# Patient Record
(demographics unavailable — no encounter records)

---

## 2024-11-15 NOTE — PLAN
[FreeTextEntry1] : 1. The patient was given rx to obtain fasting bw - will follow results  2. The patient was instructed to keep routine f/u screenings - will be seeing GYN, needs eyes checked  3. Low fat low cholesterol diet exercise  4. Flu vaccine now  5. F/U 1 year or sooner if needed

## 2024-11-15 NOTE — HISTORY OF PRESENT ILLNESS
[FreeTextEntry1] : CPE [de-identified] : Patient is a 63-year-old female with PMH of ankylosing spondylitis, endometriosis, and fibromyalgia. Former pt of Dr. Silva  Last CPE: 2023   GYN Exam: Has appt in Jan   Mammogram: Due in Jan    DEXA: Unsure    Colonoscopy: 9/2024, repeat 5 years    Ophthalmology: Due now   Dermatology: Mohs sx 11/11/24 R cheek   Dentist:  UTD   Shingles:x2   Flu: 10/23   Tdap: 2020   PNA: x1   COVID: x4  Diet: Low carb   Exercise: Cardio 2x a week, walks daily

## 2024-11-15 NOTE — HEALTH RISK ASSESSMENT
[Yes] : Yes [Monthly or less (1 pt)] : Monthly or less (1 point) [1 or 2 (0 pts)] : 1 or 2 (0 points) [Never (0 pts)] : Never (0 points) [No] : In the past 12 months have you used drugs other than those required for medical reasons? No [0] : 2) Feeling down, depressed, or hopeless: Not at all (0) [PHQ-2 Negative - No further assessment needed] : PHQ-2 Negative - No further assessment needed [Never] : Never [Patient reported mammogram was normal] : Patient reported mammogram was normal [Patient reported PAP Smear was normal] : Patient reported PAP Smear was normal [Patient reported colonoscopy was normal] : Patient reported colonoscopy was normal [MammogramDate] : 01/2024 [PapSmearDate] : 11/2023 [ColonoscopyDate] : 09/2024

## 2024-12-03 NOTE — IMAGING
[Right] : right shoulder [de-identified] : The patient is a well appearing 63 year  old female of their stated age. Neck is supple & nontender to palpation. Negative Spurling's test.   Effected Shoulder: RIGHT  Inspection: Scapula Winging: Negative Deformity: None Erythema: None Ecchymosis: None Abrasions: None Effusion: None   Range of Motion: Active Forward Flexion: 150 degrees Active Abduction: 150 degrees Passive Forward Flexion: 150 degrees Passive Abduction: 150 degrees ER @ 90 degrees: 90 degrees IR @ 90 degrees: 0 degrees LIMITED BY PAIN ER @ 0 degrees: 30 degrees Motor Exam: Forward Flexion: 3 out of 5 Flexion Plane of Scapula: 3 out of 5 Abduction: 3 out of 5 Internal Rotation: 4 out of 5 External Rotation: 4 out of 5 Distal Motor Strength: 5 out of 5   Stability Testing: Anterior: 1+ Posterior: 1+ Sulcus N: 1+ Sulcus ER: 1+ Provocative Tests: Drop Arm: Negative Impingement: POSITIVE  [place]: POSITIVE  X-Arm Adduction: Negative Belly Press: Negative Bear Hug: Negative Lift Off: Negative Apprehension: Negative Relocation: Negative Posterior Load & Shift: Negative   Palpation: AC Joint: Nontender Clavicle: Nontender SC Joint: Nontender Bicipital Groove: TENDER  Coracoid Process: Nontender Pectoralis Minor Tendon: Nontender Pectoralis Major Tendon: Nontender & palpably intact Latissimus Dorsi: Nontender Proximal Humerus: Nontender Scapula Body: Nontender Medial Scapula Boarder: Nontender Scapula Spine: Nontender   Neurologic Exam: Sensation to Light Touch: Axillary: Grossly intact Ulnar: Grossly intact Radial: Grossly intact Median: Grossly intact Other:  N/A Circulatory/Pulses: Ulnar: 2+ Radial: 2+ Other Pertinent Findings: None   Contralateral Shoulder Range of Motion: Active Forward Flexion: 180 degrees Active Abduction: 180 degrees Passive Forward Flexion: 180 degrees Passive Abduction: 180 degrees ER @ 90 degrees: 90 degrees IR @ 90 degrees: 45 degrees ER @ 0 degrees: 50 degrees Motor Exam: Forward Flexion: 5 out of 5 Flexion Plane of Scapula: 5 out of 5 Abduction: 5 out of 5 Internal Rotation: 5 out of 5 External Rotation: 5 out of 5 Distal Motor Strength: 5 out of 5 Stability Testing: Anterior: 1+ Posterior: 1+ Sulcus N: 1+ Sulcus ER: 1+ Other Pertinent Findings: None   Assessment: The patient is a 63 year old female with right shoulder pain and radiographic and physical exam findings consistent with possible rotator cuff tear. The patients condition is acute Documents/Results Reviewed Today: X-Ray right shoulder/scapula and X-Ray cervical spine  Tests/Studies Independently Interpreted Today: X-Ray right shoulder/scapula reveals evidence of mild degenerative changes, no obvious fractures. X-Ray cervical spine reveals evidence of previous C5-C6 fusion with cage in proper reduction.  Pertinent findings include: 150/150/90/0/30, 3/5 ABD, FF, FSP, 4/5 IR & ER, tender bicipital groove, +impingement, +[place]'s  Confounding medical conditions/concerns: Treated for right shoulder calcific tendinopathy, resolved through rehab and CSI.    Plan: Due to worsening pain and weakness with mechanical symptoms, patient will obtain MRI right shoulder to evaluate for possible rotator cuff tear. In the interim, we reviewed appropriate use of OTC anti-inflammatories as needed for pain, inflammation, and discomfort. Modify activity as discussed.  Tests Ordered: MRI right shoulder  Prescription Medications Ordered: Discussed use of OTC NSAIDs including but not limited to Aleve, Motrin, Tylenol Advil, etc. Braces/DME Ordered: None Activity/Work/Sports Status: As tolerated  Additional Instructions: None Follow-Up: After MRI   The patient's current medication management of their orthopedic diagnosis was reviewed today: The patient declined and/or was contraindicated for the recommended prescription medication Naprosyn and will use over the counter Advil, Alleve, Voltaren Gel or Tylenol as directed.  (1) We discussed a comprehensive treatment plan that included possible pharmaceutical management involving the use of prescription strength medications including but not limited to options such as oral Naprosyn 500mg BID, once daily Meloxicam 15 mg, or 500-650 mg Tylenol versus over the counter oral medications and topical prescription NSAID Pennsaid vs over the counter Voltaren gel.  Based on our extensive discussion, the patient declined prescription medication and will use over the counter Advil, Alleve, Voltaren Gel or Tylenol as directed. (2) There is a moderate risk of morbidity with further treatment, especially from use of prescription strength medications and possible side effects of these medications which include upset stomach with oral medications, skin reactions to topical medications and cardiac/renal issues with long term use. (3) I recommended that the patient follow-up with their medical physician to discuss any significant specific potential issues with long term medication use such as interactions with current medications or with exacerbation of underlying medical comorbidities. (4) The benefits and risks associated with use of injectable, oral or topical, prescription and over the counter anti-inflammatory medications were discussed with the patient. The patient voiced understanding of the risks including but not limited to bleeding, stroke, kidney dysfunction, heart disease, and were referred to the black box warning label for further information.   [place] attest that this documentation has been prepared under the direction and in the presence of Provider Dr. Marty Torres.   The documentation recorded by the scribe accurately reflects the services Dr. Marty BRAND, personally performed and the decisions made by me.   [FreeTextEntry1] : X-Ray right shoulder/scapula reveals evidence of mild degenerative changes, no obvious fractures.

## 2024-12-03 NOTE — IMAGING
[Right] : right shoulder [de-identified] : The patient is a well appearing 63 year  old female of their stated age. Neck is supple & nontender to palpation. Negative Spurling's test.   Effected Shoulder: RIGHT  Inspection: Scapula Winging: Negative Deformity: None Erythema: None Ecchymosis: None Abrasions: None Effusion: None   Range of Motion: Active Forward Flexion: 150 degrees Active Abduction: 150 degrees Passive Forward Flexion: 150 degrees Passive Abduction: 150 degrees ER @ 90 degrees: 90 degrees IR @ 90 degrees: 0 degrees LIMITED BY PAIN ER @ 0 degrees: 30 degrees Motor Exam: Forward Flexion: 3 out of 5 Flexion Plane of Scapula: 3 out of 5 Abduction: 3 out of 5 Internal Rotation: 4 out of 5 External Rotation: 4 out of 5 Distal Motor Strength: 5 out of 5   Stability Testing: Anterior: 1+ Posterior: 1+ Sulcus N: 1+ Sulcus ER: 1+ Provocative Tests: Drop Arm: Negative Impingement: POSITIVE  [place]: POSITIVE  X-Arm Adduction: Negative Belly Press: Negative Bear Hug: Negative Lift Off: Negative Apprehension: Negative Relocation: Negative Posterior Load & Shift: Negative   Palpation: AC Joint: Nontender Clavicle: Nontender SC Joint: Nontender Bicipital Groove: TENDER  Coracoid Process: Nontender Pectoralis Minor Tendon: Nontender Pectoralis Major Tendon: Nontender & palpably intact Latissimus Dorsi: Nontender Proximal Humerus: Nontender Scapula Body: Nontender Medial Scapula Boarder: Nontender Scapula Spine: Nontender   Neurologic Exam: Sensation to Light Touch: Axillary: Grossly intact Ulnar: Grossly intact Radial: Grossly intact Median: Grossly intact Other:  N/A Circulatory/Pulses: Ulnar: 2+ Radial: 2+ Other Pertinent Findings: None   Contralateral Shoulder Range of Motion: Active Forward Flexion: 180 degrees Active Abduction: 180 degrees Passive Forward Flexion: 180 degrees Passive Abduction: 180 degrees ER @ 90 degrees: 90 degrees IR @ 90 degrees: 45 degrees ER @ 0 degrees: 50 degrees Motor Exam: Forward Flexion: 5 out of 5 Flexion Plane of Scapula: 5 out of 5 Abduction: 5 out of 5 Internal Rotation: 5 out of 5 External Rotation: 5 out of 5 Distal Motor Strength: 5 out of 5 Stability Testing: Anterior: 1+ Posterior: 1+ Sulcus N: 1+ Sulcus ER: 1+ Other Pertinent Findings: None   Assessment: The patient is a 63 year old female with right shoulder pain and radiographic and physical exam findings consistent with possible rotator cuff tear. The patients condition is acute Documents/Results Reviewed Today: X-Ray right shoulder/scapula and X-Ray cervical spine  Tests/Studies Independently Interpreted Today: X-Ray right shoulder/scapula reveals evidence of mild degenerative changes, no obvious fractures. X-Ray cervical spine reveals evidence of previous C5-C6 fusion with cage in proper reduction.  Pertinent findings include: 150/150/90/0/30, 3/5 ABD, FF, FSP, 4/5 IR & ER, tender bicipital groove, +impingement, +[place]ircon[place]'s  Confounding medical conditions/concerns: Treated for right shoulder calcific tendinopathy, resolved through rehab and CSI.    Plan: Due to worsening pain and weakness with mechanical symptoms, patient will obtain MRI right shoulder to evaluate for possible rotator cuff tear. In the interim, we reviewed appropriate use of OTC anti-inflammatories as needed for pain, inflammation, and discomfort. Modify activity as discussed.  Tests Ordered: MRI right shoulder  Prescription Medications Ordered: Discussed use of OTC NSAIDs including but not limited to Aleve, Motrin, Tylenol Advil, etc. Braces/DME Ordered: None Activity/Work/Sports Status: As tolerated  Additional Instructions: None Follow-Up: After MRI   The patient's current medication management of their orthopedic diagnosis was reviewed today: The patient declined and/or was contraindicated for the recommended prescription medication Naprosyn and will use over the counter Advil, Alleve, Voltaren Gel or Tylenol as directed.  (1) We discussed a comprehensive treatment plan that included possible pharmaceutical management involving the use of prescription strength medications including but not limited to options such as oral Naprosyn 500mg BID, once daily Meloxicam 15 mg, or 500-650 mg Tylenol versus over the counter oral medications and topical prescription NSAID Pennsaid vs over the counter Voltaren gel.  Based on our extensive discussion, the patient declined prescription medication and will use over the counter Advil, Alleve, Voltaren Gel or Tylenol as directed. (2) There is a moderate risk of morbidity with further treatment, especially from use of prescription strength medications and possible side effects of these medications which include upset stomach with oral medications, skin reactions to topical medications and cardiac/renal issues with long term use. (3) I recommended that the patient follow-up with their medical physician to discuss any significant specific potential issues with long term medication use such as interactions with current medications or with exacerbation of underlying medical comorbidities. (4) The benefits and risks associated with use of injectable, oral or topical, prescription and over the counter anti-inflammatory medications were discussed with the patient. The patient voiced understanding of the risks including but not limited to bleeding, stroke, kidney dysfunction, heart disease, and were referred to the black box warning label for further information.   [place] attest that this documentation has been prepared under the direction and in the presence of Provider Dr. Marty Torres.   The documentation recorded by the scribe accurately reflects the services Dr. Marty BRAND, personally performed and the decisions made by me.   [FreeTextEntry1] : X-Ray right shoulder/scapula reveals evidence of mild degenerative changes, no obvious fractures.

## 2024-12-03 NOTE — HISTORY OF PRESENT ILLNESS
[de-identified] : The patient is a 63 year  old right hand dominant female who presents today complaining of right shoulder pain.  Date of Injury/Onset: 9/2024, worsening since 11/21/24 Pain:    At Rest: 1/10  With Activity:  9-10/10  Mechanism of injury: Gradual onset of pain with lifting her grandson - worsening since falling onto the shoulder on a grassy surface  This is NOT a Work Related Injury being treated under Worker's Compensation. This is NOT an athletic injury occurring associated with an interscholastic or organized sports team. Quality of symptoms: lateral shoulder pain that travels to the neck and to elbow (sometimes wrist has h/o neck fusion), clicking/cracking, limited ROM, weakness Improves with: rest, motrin  Worse with: abduction  Prior treatment: none Prior Imaging: none Out of work/sport: Currently working  School/Sport/Position/Occupation: nurse  for , works from home  Additional Information: R shoulder calcific tendonitis Dr. Torres 2021 - CSI with relief. H/O C5-6 discectomy/fusion 2011 with radicular symptoms.

## 2024-12-03 NOTE — HISTORY OF PRESENT ILLNESS
[de-identified] : The patient is a 63 year  old right hand dominant female who presents today complaining of right shoulder pain.  Date of Injury/Onset: 9/2024, worsening since 11/21/24 Pain:    At Rest: 1/10  With Activity:  9-10/10  Mechanism of injury: Gradual onset of pain with lifting her grandson - worsening since falling onto the shoulder on a grassy surface  This is NOT a Work Related Injury being treated under Worker's Compensation. This is NOT an athletic injury occurring associated with an interscholastic or organized sports team. Quality of symptoms: lateral shoulder pain that travels to the neck and to elbow (sometimes wrist has h/o neck fusion), clicking/cracking, limited ROM, weakness Improves with: rest, motrin  Worse with: abduction  Prior treatment: none Prior Imaging: none Out of work/sport: Currently working  School/Sport/Position/Occupation: nurse  for , works from home  Additional Information: R shoulder calcific tendonitis Dr. Torres 2021 - CSI with relief. H/O C5-6 discectomy/fusion 2011 with radicular symptoms.

## 2024-12-20 NOTE — IMAGING
[Right] : right shoulder [de-identified] : The patient is a well appearing 63 year  old female of their stated age. Neck is supple & nontender to palpation. Negative Spurling's test.   Effected Shoulder: RIGHT  Inspection: Scapula Winging: Negative Deformity: None Erythema: None Ecchymosis: None Abrasions: None Effusion: None   Range of Motion: Active Forward Flexion: 150 degrees Active Abduction: 150 degrees Passive Forward Flexion: 150 degrees Passive Abduction: 150 degrees ER @ 90 degrees: 90 degrees IR @ 90 degrees: 0 degrees LIMITED BY PAIN ER @ 0 degrees: 30 degrees Motor Exam: Forward Flexion: 3 out of 5 Flexion Plane of Scapula: 3 out of 5 Abduction: 3 out of 5 Internal Rotation: 4 out of 5 External Rotation: 4 out of 5 Distal Motor Strength: 5 out of 5   Stability Testing: Anterior: 1+ Posterior: 1+ Sulcus N: 1+ Sulcus ER: 1+ Provocative Tests: Drop Arm: Negative Impingement: POSITIVE  [place]: POSITIVE  X-Arm Adduction: Negative Belly Press: Negative Bear Hug: Negative Lift Off: Negative Apprehension: Negative Relocation: Negative Posterior Load & Shift: Negative   Palpation: AC Joint: Nontender Clavicle: Nontender SC Joint: Nontender Bicipital Groove: TENDER  Coracoid Process: Nontender Pectoralis Minor Tendon: Nontender Pectoralis Major Tendon: Nontender & palpably intact Latissimus Dorsi: Nontender Proximal Humerus: Nontender Scapula Body: Nontender Medial Scapula Boarder: Nontender Scapula Spine: Nontender   Neurologic Exam: Sensation to Light Touch: Axillary: Grossly intact Ulnar: Grossly intact Radial: Grossly intact Median: Grossly intact Other:  N/A Circulatory/Pulses: Ulnar: 2+ Radial: 2+ Other Pertinent Findings: None   Contralateral Shoulder Range of Motion: Active Forward Flexion: 180 degrees Active Abduction: 180 degrees Passive Forward Flexion: 180 degrees Passive Abduction: 180 degrees ER @ 90 degrees: 90 degrees IR @ 90 degrees: 45 degrees ER @ 0 degrees: 50 degrees Motor Exam: Forward Flexion: 5 out of 5 Flexion Plane of Scapula: 5 out of 5 Abduction: 5 out of 5 Internal Rotation: 5 out of 5 External Rotation: 5 out of 5 Distal Motor Strength: 5 out of 5 Stability Testing: Anterior: 1+ Posterior: 1+ Sulcus N: 1+ Sulcus ER: 1+ Other Pertinent Findings: None   Assessment: The patient is a 63-year-old female with right shoulder pain and radiographic and physical exam findings consistent with partial rotator cuff tear. The patient's condition is acute Documents/Results Reviewed Today: MRI right shoulder  Tests/Studies Independently Interpreted Today: MRI right shoulder reveals evidence of partial central subscapularis tear, supraspinatus tendinopathy,  Pertinent findings include: 150/150/90/0/30, 3/5 ABD, FF, FSP, 4/5 IR & ER, tender bicipital groove, +impingement, +[place]'s  Confounding medical conditions/concerns: Treated for right shoulder calcific tendinopathy, resolved through rehab and CSI.    Plan: Discussed treatment options for the patient's partial rotator cuff tear. Patient will start physical therapy, HEP, and stretching. Discussed taking OTC anti-inflammatories as needed - use as directed. Modify activity as discussed. Discussed treatment options in the form of injections to aid in pain, inflammation, and discomfort. Patient elected to receive right shoulder 9/1 CSI. Advised patient to rest and ice the area as tolerated.  Tests Ordered: None   Prescription Medications Ordered: Discussed use of OTC NSAIDs including but not limited to Aleve, Motrin, Tylenol Advil, etc. Braces/DME Ordered: None Activity/Work/Sports Status: As tolerated  Additional Instructions: None Follow-Up: 4 weeks   Procedure Note: Musculoskeletal Injection Diagnosis: Right Shoulder partial rotator cuff tear Procedure: Right shoulder, subacromial, 9/1 CSI   Indication:  The patient has had persistent pain despite conservative treatment.  Risks, benefits and alternatives to procedure were discussed; all questions were answered to the patient's apparent satisfaction and informed consent obtained.  The patient denied prior problems with local anesthetics, injectable cortisones, chicken allergy, coagulopathy and no relevant drug or preservative allergies or sensitivities.   The area of injection was prepared in a sterile fashion.  Prior to injection a 'Time Out' was conducted in accordance with [facility] policy and the site and nature of procedure verified with the patient.   Procedure: The procedure was carried out utilizing sterile technique from a superolateral arthroscopic portal position.   0cc of clear synovial fluid was aspirated. The specimen: (X) appeared benign and was discarded ( ) was sent for Culture / Cell Count / Crystal analysis / [_].]    Injection into the target area with care taken to aspirate frequently to minimize the risk of intravascular injection was performed with: ( ) 1cc of Depomedrol (80mg/ml) (X) 1cc of Dexamethasone (10mg/ml) ( ) 1cc of Toradol (30mg/ml) (X) 9cc of 0.5% Bupivacaine ( ) 1cc of 1% Lidocaine ( ) 5cc of 32mg Zilretta, prepared and diluted per  instructions ( ) 2 cc of Hylan G-F 20 (Synvisc) 16mg/2ml ( ) 6 cc of Hylan G-F 20 (SynvisoOne) 16mg/2ml ( ) 2cc of Euflexxa ( ) 2cc of Orthovisc ( ) 2cc of GelOne ( ) 3cc of Durolane (20mg/ml)   Patient tolerated the procedure well and direct pressure was applied for hemostasis. The patient was reminded of potential post-injection risks including, but not limited to, delayed hypersensitivity reactions and/or infection.  The patient verified that they had the office and the Emergency Room's contact information if any problems should arise.  After several minutes, the patient informed me that they felt fine and was released from the office.  The patient's current medication management of their orthopedic diagnosis was reviewed today: The patient declined and/or was contraindicated for the recommended prescription medication Naprosyn and will use over the counter Advil, Alleve, Voltaren Gel or Tylenol as directed.  (1) We discussed a comprehensive treatment plan that included possible pharmaceutical management involving the use of prescription strength medications including but not limited to options such as oral Naprosyn 500mg BID, once daily Meloxicam 15 mg, or 500-650 mg Tylenol versus over the counter oral medications and topical prescription NSAID Pennsaid vs over the counter Voltaren gel.  Based on our extensive discussion, the patient declined prescription medication and will use over the counter Advil, Alleve, Voltaren Gel or Tylenol as directed. (2) There is a moderate risk of morbidity with further treatment, especially from use of prescription strength medications and possible side effects of these medications which include upset stomach with oral medications, skin reactions to topical medications and cardiac/renal issues with long term use. (3) I recommended that the patient follow-up with their medical physician to discuss any significant specific potential issues with long term medication use such as interactions with current medications or with exacerbation of underlying medical comorbidities. (4) The benefits and risks associated with use of injectable, oral or topical, prescription and over the counter anti-inflammatory medications were discussed with the patient. The patient voiced understanding of the risks including but not limited to bleeding, stroke, kidney dysfunction, heart disease, and were referred to the black box warning label for further information.   Marylou BRAND attest that this documentation has been prepared under the direction and in the presence of Provider Dr. Marty Torres.  The documentation recorded by the scribe accurately reflects the services IDr. Marty, personally performed and the decisions made by me. [FreeTextEntry1] : X-Ray right shoulder/scapula reveals evidence of mild degenerative changes, no obvious fractures.

## 2024-12-20 NOTE — DATA REVIEWED
[MRI] : MRI [Right] : of the right [Shoulder] : shoulder [Report was reviewed and noted in the chart] : The report was reviewed and noted in the chart [I independently reviewed and interpreted images and report] : I independently reviewed and interpreted images and report [I reviewed the films/CD and additionally noted] : I reviewed the films/CD and additionally noted [FreeTextEntry1] : MRI right shoulder reveals evidence of partial central subscapularis tear, supraspinatus tendinopathy,

## 2024-12-20 NOTE — HISTORY OF PRESENT ILLNESS
[de-identified] : The patient is a 63 year  old right hand dominant female who presents today complaining of right shoulder pain.  Date of Injury/Onset: 9/2024, worsening since 11/21/24 Pain:    At Rest: 1/10  With Activity:  9-10/10  Mechanism of injury: Gradual onset of pain with lifting her grandson - worsening since falling onto the shoulder on a grassy surface  This is NOT a Work Related Injury being treated under Worker's Compensation. This is NOT an athletic injury occurring associated with an interscholastic or organized sports team. Quality of symptoms: lateral shoulder pain that travels to the neck and to elbow (sometimes wrist has h/o neck fusion), clicking/cracking, limited ROM, weakness Improves with: rest, motrin  Worse with: abduction  Treatment/Imaging/Studies Since Last Visit: MRI 	Reports Available For Review Today: MRI @ [facility] 12/9/24 Changes in last visit; patient reports dec in pain since last visit, but still has discomfort during the night side sleeping or laying on her back. C/o intermittent swelling and a constant ache. Interested in repeat CSI since meloxicam aggravates her stomach and has success with pain relief in 2021. Out of work/sport: Currently working  School/Sport/Position/Occupation: nurse  for , works from home  Additional Information: R shoulder calcific tendonitis Dr. Torres 2021 - CSI with relief. H/O C5-6 discectomy/fusion 2011 with radicular symptoms.

## 2024-12-20 NOTE — IMAGING
[Right] : right shoulder [de-identified] : The patient is a well appearing 63 year  old female of their stated age. Neck is supple & nontender to palpation. Negative Spurling's test.   Effected Shoulder: RIGHT  Inspection: Scapula Winging: Negative Deformity: None Erythema: None Ecchymosis: None Abrasions: None Effusion: None   Range of Motion: Active Forward Flexion: 150 degrees Active Abduction: 150 degrees Passive Forward Flexion: 150 degrees Passive Abduction: 150 degrees ER @ 90 degrees: 90 degrees IR @ 90 degrees: 0 degrees LIMITED BY PAIN ER @ 0 degrees: 30 degrees Motor Exam: Forward Flexion: 3 out of 5 Flexion Plane of Scapula: 3 out of 5 Abduction: 3 out of 5 Internal Rotation: 4 out of 5 External Rotation: 4 out of 5 Distal Motor Strength: 5 out of 5   Stability Testing: Anterior: 1+ Posterior: 1+ Sulcus N: 1+ Sulcus ER: 1+ Provocative Tests: Drop Arm: Negative Impingement: POSITIVE  [place]: POSITIVE  X-Arm Adduction: Negative Belly Press: Negative Bear Hug: Negative Lift Off: Negative Apprehension: Negative Relocation: Negative Posterior Load & Shift: Negative   Palpation: AC Joint: Nontender Clavicle: Nontender SC Joint: Nontender Bicipital Groove: TENDER  Coracoid Process: Nontender Pectoralis Minor Tendon: Nontender Pectoralis Major Tendon: Nontender & palpably intact Latissimus Dorsi: Nontender Proximal Humerus: Nontender Scapula Body: Nontender Medial Scapula Boarder: Nontender Scapula Spine: Nontender   Neurologic Exam: Sensation to Light Touch: Axillary: Grossly intact Ulnar: Grossly intact Radial: Grossly intact Median: Grossly intact Other:  N/A Circulatory/Pulses: Ulnar: 2+ Radial: 2+ Other Pertinent Findings: None   Contralateral Shoulder Range of Motion: Active Forward Flexion: 180 degrees Active Abduction: 180 degrees Passive Forward Flexion: 180 degrees Passive Abduction: 180 degrees ER @ 90 degrees: 90 degrees IR @ 90 degrees: 45 degrees ER @ 0 degrees: 50 degrees Motor Exam: Forward Flexion: 5 out of 5 Flexion Plane of Scapula: 5 out of 5 Abduction: 5 out of 5 Internal Rotation: 5 out of 5 External Rotation: 5 out of 5 Distal Motor Strength: 5 out of 5 Stability Testing: Anterior: 1+ Posterior: 1+ Sulcus N: 1+ Sulcus ER: 1+ Other Pertinent Findings: None   Assessment: The patient is a 63-year-old female with right shoulder pain and radiographic and physical exam findings consistent with partial rotator cuff tear. The patient's condition is acute Documents/Results Reviewed Today: MRI right shoulder  Tests/Studies Independently Interpreted Today: MRI right shoulder reveals evidence of partial central subscapularis tear, supraspinatus tendinopathy,  Pertinent findings include: 150/150/90/0/30, 3/5 ABD, FF, FSP, 4/5 IR & ER, tender bicipital groove, +impingement, +[place]'s  Confounding medical conditions/concerns: Treated for right shoulder calcific tendinopathy, resolved through rehab and CSI.    Plan: Discussed treatment options for the patient's partial rotator cuff tear. Patient will start physical therapy, HEP, and stretching. Discussed taking OTC anti-inflammatories as needed - use as directed. Modify activity as discussed. Discussed treatment options in the form of injections to aid in pain, inflammation, and discomfort. Patient elected to receive right shoulder 9/1 CSI. Advised patient to rest and ice the area as tolerated.  Tests Ordered: None   Prescription Medications Ordered: Discussed use of OTC NSAIDs including but not limited to Aleve, Motrin, Tylenol Advil, etc. Braces/DME Ordered: None Activity/Work/Sports Status: As tolerated  Additional Instructions: None Follow-Up: 4 weeks   Procedure Note: Musculoskeletal Injection Diagnosis: Right Shoulder partial rotator cuff tear Procedure: Right shoulder, subacromial, 9/1 CSI   Indication:  The patient has had persistent pain despite conservative treatment.  Risks, benefits and alternatives to procedure were discussed; all questions were answered to the patient's apparent satisfaction and informed consent obtained.  The patient denied prior problems with local anesthetics, injectable cortisones, chicken allergy, coagulopathy and no relevant drug or preservative allergies or sensitivities.   The area of injection was prepared in a sterile fashion.  Prior to injection a 'Time Out' was conducted in accordance with [facility] policy and the site and nature of procedure verified with the patient.   Procedure: The procedure was carried out utilizing sterile technique from a superolateral arthroscopic portal position.   0cc of clear synovial fluid was aspirated. The specimen: (X) appeared benign and was discarded ( ) was sent for Culture / Cell Count / Crystal analysis / [_].]    Injection into the target area with care taken to aspirate frequently to minimize the risk of intravascular injection was performed with: ( ) 1cc of Depomedrol (80mg/ml) (X) 1cc of Dexamethasone (10mg/ml) ( ) 1cc of Toradol (30mg/ml) (X) 9cc of 0.5% Bupivacaine ( ) 1cc of 1% Lidocaine ( ) 5cc of 32mg Zilretta, prepared and diluted per  instructions ( ) 2 cc of Hylan G-F 20 (Synvisc) 16mg/2ml ( ) 6 cc of Hylan G-F 20 (SynvisoOne) 16mg/2ml ( ) 2cc of Euflexxa ( ) 2cc of Orthovisc ( ) 2cc of GelOne ( ) 3cc of Durolane (20mg/ml)   Patient tolerated the procedure well and direct pressure was applied for hemostasis. The patient was reminded of potential post-injection risks including, but not limited to, delayed hypersensitivity reactions and/or infection.  The patient verified that they had the office and the Emergency Room's contact information if any problems should arise.  After several minutes, the patient informed me that they felt fine and was released from the office.  The patient's current medication management of their orthopedic diagnosis was reviewed today: The patient declined and/or was contraindicated for the recommended prescription medication Naprosyn and will use over the counter Advil, Alleve, Voltaren Gel or Tylenol as directed.  (1) We discussed a comprehensive treatment plan that included possible pharmaceutical management involving the use of prescription strength medications including but not limited to options such as oral Naprosyn 500mg BID, once daily Meloxicam 15 mg, or 500-650 mg Tylenol versus over the counter oral medications and topical prescription NSAID Pennsaid vs over the counter Voltaren gel.  Based on our extensive discussion, the patient declined prescription medication and will use over the counter Advil, Alleve, Voltaren Gel or Tylenol as directed. (2) There is a moderate risk of morbidity with further treatment, especially from use of prescription strength medications and possible side effects of these medications which include upset stomach with oral medications, skin reactions to topical medications and cardiac/renal issues with long term use. (3) I recommended that the patient follow-up with their medical physician to discuss any significant specific potential issues with long term medication use such as interactions with current medications or with exacerbation of underlying medical comorbidities. (4) The benefits and risks associated with use of injectable, oral or topical, prescription and over the counter anti-inflammatory medications were discussed with the patient. The patient voiced understanding of the risks including but not limited to bleeding, stroke, kidney dysfunction, heart disease, and were referred to the black box warning label for further information.   Marylou BRAND attest that this documentation has been prepared under the direction and in the presence of Provider Dr. Marty Torres.  The documentation recorded by the scribe accurately reflects the services IDr. Marty, personally performed and the decisions made by me. [FreeTextEntry1] : X-Ray right shoulder/scapula reveals evidence of mild degenerative changes, no obvious fractures.